# Patient Record
Sex: MALE | Race: WHITE | NOT HISPANIC OR LATINO | Employment: OTHER | URBAN - METROPOLITAN AREA
[De-identification: names, ages, dates, MRNs, and addresses within clinical notes are randomized per-mention and may not be internally consistent; named-entity substitution may affect disease eponyms.]

---

## 2024-04-24 ENCOUNTER — OFFICE VISIT (OUTPATIENT)
Dept: PODIATRY | Facility: CLINIC | Age: 79
End: 2024-04-24
Payer: MEDICARE

## 2024-04-24 VITALS — HEIGHT: 74 IN | WEIGHT: 244 LBS | BODY MASS INDEX: 31.32 KG/M2

## 2024-04-24 DIAGNOSIS — M77.52 CAPSULITIS OF TOE, LEFT: Primary | ICD-10-CM

## 2024-04-24 DIAGNOSIS — M77.51 CAPSULITIS OF TOE, RIGHT: ICD-10-CM

## 2024-04-24 PROCEDURE — 1160F RVW MEDS BY RX/DR IN RCRD: CPT | Mod: CPTII,S$GLB,, | Performed by: PODIATRIST

## 2024-04-24 PROCEDURE — 1125F AMNT PAIN NOTED PAIN PRSNT: CPT | Mod: CPTII,S$GLB,, | Performed by: PODIATRIST

## 2024-04-24 PROCEDURE — 1159F MED LIST DOCD IN RCRD: CPT | Mod: CPTII,S$GLB,, | Performed by: PODIATRIST

## 2024-04-24 PROCEDURE — 20600 DRAIN/INJ JOINT/BURSA W/O US: CPT | Mod: 50,S$GLB,, | Performed by: PODIATRIST

## 2024-04-24 PROCEDURE — 99203 OFFICE O/P NEW LOW 30 MIN: CPT | Mod: 25,S$GLB,, | Performed by: PODIATRIST

## 2024-04-24 RX ORDER — BENAZEPRIL HYDROCHLORIDE 40 MG/1
40 TABLET ORAL
COMMUNITY

## 2024-04-24 RX ORDER — BIMATOPROST 0.1 MG/ML
SOLUTION/ DROPS OPHTHALMIC
COMMUNITY
Start: 2024-03-29

## 2024-04-24 RX ORDER — SPIRONOLACTONE 25 MG/1
25 TABLET ORAL
COMMUNITY

## 2024-04-24 RX ORDER — OMEPRAZOLE 40 MG/1
40 CAPSULE, DELAYED RELEASE ORAL 2 TIMES DAILY
COMMUNITY
Start: 2024-03-11

## 2024-04-24 RX ORDER — DILTIAZEM HYDROCHLORIDE 240 MG/1
240 CAPSULE, EXTENDED RELEASE ORAL
COMMUNITY

## 2024-04-24 RX ORDER — COLCHICINE 0.6 MG/1
TABLET ORAL
COMMUNITY
Start: 2023-11-18

## 2024-04-24 RX ORDER — DULAGLUTIDE 1.5 MG/.5ML
1.5 INJECTION, SOLUTION SUBCUTANEOUS WEEKLY
COMMUNITY
Start: 2024-02-03

## 2024-04-24 RX ORDER — CARBIDOPA AND LEVODOPA 25; 100 MG/1; MG/1
1 TABLET ORAL 3 TIMES DAILY
COMMUNITY
Start: 2024-01-23

## 2024-04-24 RX ORDER — TIRZEPATIDE 2.5 MG/.5ML
2.5 INJECTION, SOLUTION SUBCUTANEOUS
COMMUNITY
Start: 2024-04-10 | End: 2024-05-30

## 2024-04-24 RX ORDER — KETOROLAC TROMETHAMINE 5 MG/ML
SOLUTION OPHTHALMIC
COMMUNITY
Start: 2024-04-02

## 2024-04-24 RX ORDER — DULAGLUTIDE 0.75 MG/.5ML
0.75 INJECTION, SOLUTION SUBCUTANEOUS WEEKLY
COMMUNITY
Start: 2023-11-02

## 2024-04-24 RX ORDER — NAPROXEN 500 MG/1
500 TABLET ORAL 2 TIMES DAILY WITH MEALS
Qty: 60 TABLET | Refills: 0 | Status: SHIPPED | OUTPATIENT
Start: 2024-04-24 | End: 2024-05-23

## 2024-04-24 RX ORDER — FLUTICASONE PROPIONATE 50 MCG
SPRAY, SUSPENSION (ML) NASAL
COMMUNITY

## 2024-04-24 RX ORDER — BRIMONIDINE TARTRATE 2 MG/ML
SOLUTION/ DROPS OPHTHALMIC
COMMUNITY
Start: 2024-01-19

## 2024-04-24 RX ORDER — ROSUVASTATIN CALCIUM 20 MG/1
20 TABLET, COATED ORAL NIGHTLY
COMMUNITY

## 2024-04-24 RX ORDER — METOPROLOL SUCCINATE 50 MG/1
50 TABLET, EXTENDED RELEASE ORAL
COMMUNITY

## 2024-04-24 RX ORDER — FLUTICASONE FUROATE, UMECLIDINIUM BROMIDE AND VILANTEROL TRIFENATATE 100; 62.5; 25 UG/1; UG/1; UG/1
1 POWDER RESPIRATORY (INHALATION)
COMMUNITY

## 2024-04-24 RX ORDER — CITALOPRAM 20 MG/1
20 TABLET, FILM COATED ORAL
COMMUNITY

## 2024-04-24 RX ORDER — DORZOLAMIDE HYDROCHLORIDE AND TIMOLOL MALEATE 20; 5 MG/ML; MG/ML
SOLUTION/ DROPS OPHTHALMIC
COMMUNITY
Start: 2024-03-29

## 2024-04-24 RX ORDER — TEMAZEPAM 15 MG/1
15 CAPSULE ORAL
COMMUNITY
Start: 2024-02-29 | End: 2024-05-29

## 2024-04-24 RX ORDER — APIXABAN 5 MG/1
5 TABLET, FILM COATED ORAL 2 TIMES DAILY
COMMUNITY

## 2024-04-24 RX ORDER — DOXYCYCLINE HYCLATE 100 MG
100 TABLET ORAL 2 TIMES DAILY
COMMUNITY
Start: 2024-03-12

## 2024-04-24 RX ADMIN — DEXAMETHASONE SODIUM PHOSPHATE 4 MG: 4 INJECTION, SOLUTION INTRA-ARTICULAR; INTRALESIONAL; INTRAMUSCULAR; INTRAVENOUS; SOFT TISSUE at 02:04

## 2024-04-24 RX ADMIN — LIDOCAINE HYDROCHLORIDE 1 ML: 10 INJECTION INFILTRATION; PERINEURAL at 02:04

## 2024-05-14 ENCOUNTER — OFFICE VISIT (OUTPATIENT)
Dept: PODIATRY | Facility: CLINIC | Age: 79
End: 2024-05-14
Payer: MEDICARE

## 2024-05-14 VITALS — WEIGHT: 242.81 LBS | BODY MASS INDEX: 31.16 KG/M2 | HEIGHT: 74 IN

## 2024-05-14 DIAGNOSIS — M77.51 CAPSULITIS OF TOE, RIGHT: Primary | ICD-10-CM

## 2024-05-14 PROCEDURE — 1159F MED LIST DOCD IN RCRD: CPT | Mod: CPTII,S$GLB,, | Performed by: PODIATRIST

## 2024-05-14 PROCEDURE — 1160F RVW MEDS BY RX/DR IN RCRD: CPT | Mod: CPTII,S$GLB,, | Performed by: PODIATRIST

## 2024-05-14 PROCEDURE — 20600 DRAIN/INJ JOINT/BURSA W/O US: CPT | Mod: RT,S$GLB,, | Performed by: PODIATRIST

## 2024-05-14 PROCEDURE — 1125F AMNT PAIN NOTED PAIN PRSNT: CPT | Mod: CPTII,S$GLB,, | Performed by: PODIATRIST

## 2024-05-14 PROCEDURE — 99213 OFFICE O/P EST LOW 20 MIN: CPT | Mod: 25,S$GLB,, | Performed by: PODIATRIST

## 2024-05-14 RX ADMIN — DEXAMETHASONE SODIUM PHOSPHATE 4 MG: 4 INJECTION, SOLUTION INTRA-ARTICULAR; INTRALESIONAL; INTRAMUSCULAR; INTRAVENOUS; SOFT TISSUE at 11:05

## 2024-05-14 RX ADMIN — LIDOCAINE HYDROCHLORIDE 1 ML: 10 INJECTION INFILTRATION; PERINEURAL at 11:05

## 2024-05-15 RX ORDER — DEXAMETHASONE SODIUM PHOSPHATE 4 MG/ML
4 INJECTION, SOLUTION INTRA-ARTICULAR; INTRALESIONAL; INTRAMUSCULAR; INTRAVENOUS; SOFT TISSUE
Status: DISCONTINUED | OUTPATIENT
Start: 2024-04-24 | End: 2024-05-15 | Stop reason: HOSPADM

## 2024-05-15 RX ORDER — LIDOCAINE HYDROCHLORIDE 10 MG/ML
1 INJECTION INFILTRATION; PERINEURAL
Status: DISCONTINUED | OUTPATIENT
Start: 2024-04-24 | End: 2024-05-15 | Stop reason: HOSPADM

## 2024-05-15 NOTE — PROGRESS NOTES
Subjective:     Patient ID: Endy Rico is a 78 y.o. male.    Chief Complaint: Foot Pain and Foot Swelling    Endy is a 78 y.o. male who presents to the podiatry clinic  with complaint of  bilateral foot pain. Onset of the symptoms was several days ago. Precipitating event: none known. Current symptoms include: ability to bear weight, but with some pain, redness, swelling, and worsening symptoms after a period of activity. Aggravating factors: walking. Symptoms have gradually worsened. Patient has had prior foot problems.  Treatment to date:  colchicine . Patients rates pain 8/10 on pain scale.    Review of Systems   Constitutional: Negative for chills and fever.   Cardiovascular:  Negative for chest pain and leg swelling.   Respiratory:  Negative for cough and shortness of breath.    Gastrointestinal:  Negative for diarrhea, nausea and vomiting.        Objective:     Physical Exam  Vitals reviewed.   Constitutional:       General: He is not in acute distress.     Appearance: Normal appearance. He is not ill-appearing.   HENT:      Head: Normocephalic.      Nose: Nose normal.   Cardiovascular:      Rate and Rhythm: Normal rate.   Pulmonary:      Effort: Pulmonary effort is normal. No respiratory distress.   Skin:     Capillary Refill: Capillary refill takes 2 to 3 seconds.   Neurological:      Mental Status: He is alert and oriented to person, place, and time.   Psychiatric:         Mood and Affect: Mood normal.         Behavior: Behavior normal.         Thought Content: Thought content normal.     Neurologic: Protective and light touch sensation intact bilateral lower extremity, positive paresthesias reported   Dermatologic: No open lesions noted bilateral foot, no rashes noted bilateral foot, mild swelling and redness noted at the bilateral 1st MPJ   Musculoskeletal:  5/5 muscle strength noted bilateral foot, ankle joint range of motion is reduced without pain, pain and tenderness with palpation and range of  motion of the bilateral 1st MPJ, bilateral 1st MPJ range of motion is mildly reduced, no masses noted bilateral foot   Vascular: DP and PT pulses palpable 1/4 bilateral foot, +1 nonpitting edema noted to bilateral ankle, capillary fill time less 3 seconds to distal aspect of the digits bilateral foot      Assessment:      Encounter Diagnoses   Name Primary?    Capsulitis of toe, left Yes    Capsulitis of toe, right      Plan:     Endy was seen today for foot pain and foot swelling.    Diagnoses and all orders for this visit:    Capsulitis of toe, left    Capsulitis of toe, right    Other orders  -     naproxen (NAPROSYN) 500 MG tablet; Take 1 tablet (500 mg total) by mouth 2 (two) times daily with meals.      I counseled the patient on his conditions, their implications and medical management.        1. Patient was examined and evaluated.    2. Discussed with patient etiology of capsulitis versus gout complaints bilateral 1st MPJ.  Discussed with patient potential benefits of a local corticosteroid injection for improvement of pain complaints in the area.  Patient was advised to ice and elevate the bilateral lower extremity at end of days and decrease the amount of ambulation.  Discussed with patient etiology of gout.  Patient was also advised of potential benefits of prescription NSAIDs for relief of pain.    Small Joint Aspiration/Injection: R great MTP, L great MTP    Date/Time: 4/24/2024 2:00 PM    Performed by: Yunier Martin DPM  Authorized by: Yunier Martin DPM    Consent Done?:  Yes (Verbal)  Indications:  Arthritis and pain  Location:  Great toe  Site:  R great MTP and L great MTP  Ultrasonic guidance for needle placement?: No    Needle size:  25 G  Approach:  Medial  Medications:  1 mL LIDOcaine HCL 10 mg/ml (1%) 10 mg/mL (1 %); 4 mg dexAMETHasone 4 mg/mL  Patient tolerance:  Patient tolerated the procedure well with no immediate complications      3. Patient will continue with comfortable shoe  gear both inside and outside the home   4. Patient will follow-up in 1-2 weeks or p.r.n. for complaints

## 2024-05-21 ENCOUNTER — OFFICE VISIT (OUTPATIENT)
Dept: PODIATRY | Facility: CLINIC | Age: 79
End: 2024-05-21
Payer: MEDICARE

## 2024-05-21 VITALS — WEIGHT: 244 LBS | HEIGHT: 74 IN | BODY MASS INDEX: 31.32 KG/M2

## 2024-05-21 DIAGNOSIS — M25.572 SINUS TARSI SYNDROME OF LEFT ANKLE: Primary | ICD-10-CM

## 2024-05-21 DIAGNOSIS — M10.372 ACUTE GOUT DUE TO RENAL IMPAIRMENT INVOLVING TOE OF LEFT FOOT: ICD-10-CM

## 2024-05-21 PROCEDURE — 1160F RVW MEDS BY RX/DR IN RCRD: CPT | Mod: CPTII,S$GLB,, | Performed by: PODIATRIST

## 2024-05-21 PROCEDURE — 20605 DRAIN/INJ JOINT/BURSA W/O US: CPT | Mod: LT,S$GLB,, | Performed by: PODIATRIST

## 2024-05-21 PROCEDURE — 1125F AMNT PAIN NOTED PAIN PRSNT: CPT | Mod: CPTII,S$GLB,, | Performed by: PODIATRIST

## 2024-05-21 PROCEDURE — 1159F MED LIST DOCD IN RCRD: CPT | Mod: CPTII,S$GLB,, | Performed by: PODIATRIST

## 2024-05-21 PROCEDURE — 99214 OFFICE O/P EST MOD 30 MIN: CPT | Mod: 25,S$GLB,, | Performed by: PODIATRIST

## 2024-05-21 RX ORDER — METHYLPREDNISOLONE 4 MG/1
TABLET ORAL
Qty: 21 EACH | Refills: 0 | Status: SHIPPED | OUTPATIENT
Start: 2024-05-21 | End: 2024-06-11

## 2024-05-21 RX ADMIN — DEXAMETHASONE SODIUM PHOSPHATE 4 MG: 4 INJECTION, SOLUTION INTRA-ARTICULAR; INTRALESIONAL; INTRAMUSCULAR; INTRAVENOUS; SOFT TISSUE at 02:05

## 2024-05-21 RX ADMIN — LIDOCAINE HYDROCHLORIDE 1 ML: 10 INJECTION INFILTRATION; PERINEURAL at 02:05

## 2024-05-21 NOTE — PROGRESS NOTES
Subjective:     Patient ID: Endy Rico is a 78 y.o. male.    Chief Complaint: Foot Pain    Endy is a 78 y.o. male who presents to the podiatry clinic  with complaint of  left foot pain. Onset of the symptoms was several days ago. Precipitating event: none known. Current symptoms include: ability to bear weight, but with some pain, redness, swelling, and worsening symptoms after a period of activity. Aggravating factors: any weight bearing. Symptoms have gradually improved. Patient has had prior foot problems. . Treatment to date: none. Patients rates pain 9/10 on pain scale.    Review of Systems   Constitutional: Negative for chills and fever.   Cardiovascular:  Positive for leg swelling. Negative for chest pain.   Respiratory:  Negative for cough and shortness of breath.    Gastrointestinal:  Negative for diarrhea, nausea and vomiting.        Objective:     Physical Exam  Vitals reviewed.   Constitutional:       General: He is not in acute distress.     Appearance: Normal appearance. He is not ill-appearing.   HENT:      Head: Normocephalic.      Nose: Nose normal.   Pulmonary:      Effort: Pulmonary effort is normal. No respiratory distress.   Skin:     Capillary Refill: Capillary refill takes 2 to 3 seconds.   Neurological:      Mental Status: He is alert and oriented to person, place, and time.   Psychiatric:         Mood and Affect: Mood normal.         Behavior: Behavior normal.         Thought Content: Thought content normal.       Neurologic: Protective and light touch sensation intact bilateral lower extremity, positive paresthesias reported bilateral foot   Musculoskeletal: 5/5 muscle strength noted bilateral foot, left ankle joint range of motion is reduced and painful, no masses noted bilateral foot, no pain and tenderness with palpation bilateral 1st MPJs, pain and tenderness with palpation of the left sinus tarsi region and range of motion of the left ankle and inversion/ eversion   Dermatologic: No  open lesions noted bilateral foot, no rashes noted bilateral foot, no interdigital maceration noted bilateral foot, left ankle is swollen with warmth and redness noted   Vascular:  DP PT pulses palpable 1/4 bilateral foot, capillary fill time less than 3 seconds to distal aspect of the digits bilateral foot, +1 nonpitting edema noted to bilateral ankle left foot greater than right    Assessment:      Encounter Diagnoses   Name Primary?    Sinus tarsi syndrome of left ankle Yes    Acute gout due to renal impairment involving toe of left foot      Plan:     Endy was seen today for foot pain.    Diagnoses and all orders for this visit:    Sinus tarsi syndrome of left ankle  -     Intermediate Joint Aspiration/Injection: L ankle    Acute gout due to renal impairment involving toe of left foot  -     methylPREDNISolone (MEDROL DOSEPACK) 4 mg tablet; use as directed      I counseled the patient on his conditions, their implications and medical management.        1. Patient was examined and evaluated.    2. Discussed with patient etiology of sinus tarsi syndrome to the left ankle.  Patient made aware of potential benefits of local corticosteroid injection oral Medrol Dosepak for improvement of pain complaints.  Patient will decrease the amount of ambulation and ice and elevate the left lower extremity end of days.    Intermediate Joint Aspiration/Injection: L ankle    Date/Time: 5/21/2024 2:15 PM    Performed by: Yunier Martin DPM  Authorized by: Yunier Martin DPM    Consent Done?:  Yes (Verbal)  Indications:  Pain, arthritis and joint swelling    Location:  Ankle  Site:  L ankle  Ultrasonic Guidance for needle placement: No  Needle size:  25 G  Approach:  Anterolateral  Medications:  1 mL LIDOcaine HCL 10 mg/ml (1%) 10 mg/mL (1 %); 4 mg dexAMETHasone 4 mg/mL  Patient tolerance:  Patient tolerated the procedure well with no immediate complications        3. Patient was made aware that we can not rule out acute  gout attack of the left ankle at this point.  Discussed with patient need for obtainment of uric acid levels at the next primary care physician appointment.  Patient made aware that previously discussed local corticosteroid injection or oral Medrol Dosepak should improve complaints from both gout and sinus tarsiitis  4. Patient will follow-up in 1 week or p.r.n. for complaints

## 2024-05-23 RX ORDER — NAPROXEN 500 MG/1
500 TABLET ORAL 2 TIMES DAILY WITH MEALS
Qty: 60 TABLET | Refills: 0 | Status: SHIPPED | OUTPATIENT
Start: 2024-05-23

## 2024-05-30 ENCOUNTER — OFFICE VISIT (OUTPATIENT)
Dept: PODIATRY | Facility: CLINIC | Age: 79
End: 2024-05-30
Payer: MEDICARE

## 2024-05-30 VITALS — HEIGHT: 74 IN | WEIGHT: 244 LBS | BODY MASS INDEX: 31.32 KG/M2

## 2024-05-30 DIAGNOSIS — M77.42 METATARSALGIA OF BOTH FEET: Primary | ICD-10-CM

## 2024-05-30 DIAGNOSIS — M77.41 METATARSALGIA OF BOTH FEET: Primary | ICD-10-CM

## 2024-05-30 PROCEDURE — 1159F MED LIST DOCD IN RCRD: CPT | Mod: CPTII,S$GLB,, | Performed by: PODIATRIST

## 2024-05-30 PROCEDURE — 1160F RVW MEDS BY RX/DR IN RCRD: CPT | Mod: CPTII,S$GLB,, | Performed by: PODIATRIST

## 2024-05-30 PROCEDURE — 1126F AMNT PAIN NOTED NONE PRSNT: CPT | Mod: CPTII,S$GLB,, | Performed by: PODIATRIST

## 2024-05-30 PROCEDURE — 99213 OFFICE O/P EST LOW 20 MIN: CPT | Mod: S$GLB,,, | Performed by: PODIATRIST

## 2024-05-30 RX ORDER — TIRZEPATIDE 5 MG/.5ML
INJECTION, SOLUTION SUBCUTANEOUS
COMMUNITY
Start: 2024-05-28

## 2024-06-02 RX ORDER — LIDOCAINE HYDROCHLORIDE 10 MG/ML
1 INJECTION INFILTRATION; PERINEURAL
Status: DISCONTINUED | OUTPATIENT
Start: 2024-05-14 | End: 2024-06-02 | Stop reason: HOSPADM

## 2024-06-02 RX ORDER — DEXAMETHASONE SODIUM PHOSPHATE 4 MG/ML
4 INJECTION, SOLUTION INTRA-ARTICULAR; INTRALESIONAL; INTRAMUSCULAR; INTRAVENOUS; SOFT TISSUE
Status: DISCONTINUED | OUTPATIENT
Start: 2024-05-14 | End: 2024-06-02 | Stop reason: HOSPADM

## 2024-06-03 ENCOUNTER — OFFICE VISIT (OUTPATIENT)
Dept: PODIATRY | Facility: CLINIC | Age: 79
End: 2024-06-03
Payer: MEDICARE

## 2024-06-03 VITALS — WEIGHT: 244 LBS | HEIGHT: 74 IN | BODY MASS INDEX: 31.32 KG/M2

## 2024-06-03 DIAGNOSIS — B35.3 TINEA PEDIS OF BOTH FEET: Primary | ICD-10-CM

## 2024-06-03 PROCEDURE — 99214 OFFICE O/P EST MOD 30 MIN: CPT | Mod: S$GLB,,, | Performed by: PODIATRIST

## 2024-06-03 PROCEDURE — 1159F MED LIST DOCD IN RCRD: CPT | Mod: CPTII,S$GLB,, | Performed by: PODIATRIST

## 2024-06-03 PROCEDURE — 1160F RVW MEDS BY RX/DR IN RCRD: CPT | Mod: CPTII,S$GLB,, | Performed by: PODIATRIST

## 2024-06-03 RX ORDER — CLOTRIMAZOLE AND BETAMETHASONE DIPROPIONATE 10; .64 MG/G; MG/G
CREAM TOPICAL 2 TIMES DAILY
Qty: 45 G | Refills: 2 | Status: SHIPPED | OUTPATIENT
Start: 2024-06-03

## 2024-06-03 RX ORDER — METHYLPREDNISOLONE 4 MG/1
TABLET ORAL
Qty: 21 EACH | Refills: 0 | Status: SHIPPED | OUTPATIENT
Start: 2024-06-03 | End: 2024-06-24

## 2024-06-03 NOTE — PROGRESS NOTES
Subjective:     Patient ID: Endy Rico is a 78 y.o. male.    Chief Complaint: Foot Pain    Endy is a 78 y.o. male who presents to the podiatry clinic  with complaint of  left foot pain. Onset of the symptoms was several days ago. Precipitating event: increased activity and recent round of golf . Current symptoms include: ability to bear weight, but with some pain, stiffness, swelling, and worsening symptoms after a period of activity. Aggravating factors: walking. Symptoms have waxed and waned but are better overall. Patient has had prior foot problems. Treatment to date: corticosteroid injection which was somewhat effective and oral Medrol dosepak which was especially effective . Patients rates pain 7/10 on pain scale.  Patient also complains of red peeling itchy skin left foot greater than right.    Review of Systems   Constitutional: Negative for chills and fever.   Cardiovascular:  Positive for leg swelling. Negative for chest pain.   Respiratory:  Negative for cough and shortness of breath.    Gastrointestinal:  Negative for diarrhea, nausea and vomiting.        Objective:     Physical Exam  Vitals reviewed.   Constitutional:       General: He is not in acute distress.     Appearance: Normal appearance. He is not ill-appearing.   HENT:      Head: Normocephalic.      Nose: Nose normal.   Pulmonary:      Effort: Pulmonary effort is normal. No respiratory distress.   Skin:     Capillary Refill: Capillary refill takes 2 to 3 seconds.   Neurological:      Mental Status: He is alert and oriented to person, place, and time.   Psychiatric:         Mood and Affect: Mood normal.         Behavior: Behavior normal.         Thought Content: Thought content normal.       Dermatologic: Dry, flaky, erythematous skin noted in a moccasin distribution bilateral lower extremity, mild redness noted to the dorsal aspect of the left foot, no open lesions noted bilateral foot, no rashes noted bilateral foot   Neurologic: Protective  and light touch sensation intact bilateral lower extremity, positive paresthesias reported   Vascular: DP and PT pulses palpable 1/4 bilateral foot, + 2 nonpitting edema noted to the left foot, pedal hair growth is absent bilateral lower extremity   Musculoskeletal:  Bilateral 5/5 muscle strength noted bilateral foot, ankle joint range of motion is reduced to the left with pain and tenderness in the anterior lateral ankle gutter and the sinus tarsi region    Assessment:      Encounter Diagnosis   Name Primary?    Tinea pedis of both feet Yes     Plan:     Endy was seen today for foot pain.    Diagnoses and all orders for this visit:    Tinea pedis of both feet  -     clotrimazole-betamethasone 1-0.05% (LOTRISONE) cream; Apply topically 2 (two) times daily.    Other orders  -     methylPREDNISolone (MEDROL DOSEPACK) 4 mg tablet; use as directed      I counseled the patient on his conditions, their implications and medical management.        1. Patient was examined and evaluated.    2. Discussed with patient etiology of sinus tarsi syndrome.  Discussed with patient potential benefits of local corticosteroid injection or oral Medrol Dosepak for improvement of pain complaints.  Patient was advised ice and elevate the lower extremity when at rest and decrease the amount of ambulation.  3. Discussed with patient etiology of tinea pedis.  Discussed with patient proper use of prescription Lotrisone cream.  Patient was warned of potential recurrence over time.   4. Patient was advised to reduced level of daily activity.  Patient was cautioned against repeat golf activity  5. Patient will follow up in 1 week or p.r.n. for complaints

## 2024-06-03 NOTE — PROGRESS NOTES
Subjective:     Patient ID: Endy Rico is a 78 y.o. male.    Chief Complaint: Foot Pain    Endy is a 78 y.o. male who presents to the podiatry clinic  with complaint of  right foot pain. Onset of the symptoms was several weeks ago. Precipitating event: none known. Current symptoms include: ability to bear weight, but with some pain and worsening symptoms after a period of activity. Aggravating factors: walking. Symptoms have gradually improved. Patient has had prior foot problems. Treatment to date: corticosteroid injection which was effective. Patients rates pain 2/10 on pain scale.    Review of Systems   Constitutional: Negative for chills and fever.   Cardiovascular:  Negative for chest pain and leg swelling.   Respiratory:  Negative for cough and shortness of breath.    Gastrointestinal:  Negative for diarrhea, nausea and vomiting.        Objective:     Physical Exam  Vitals reviewed.   Constitutional:       General: He is not in acute distress.     Appearance: Normal appearance. He is not ill-appearing.   HENT:      Head: Normocephalic.      Nose: Nose normal.   Cardiovascular:      Rate and Rhythm: Normal rate.   Pulmonary:      Effort: Pulmonary effort is normal. No respiratory distress.   Skin:     Capillary Refill: Capillary refill takes 2 to 3 seconds.   Neurological:      Mental Status: He is alert and oriented to person, place, and time.   Psychiatric:         Mood and Affect: Mood normal.         Behavior: Behavior normal.         Thought Content: Thought content normal.     Neurologic: Protective and light touch sensation intact bilateral lower extremity, positive paresthesias reported   Dermatologic: No open lesions noted bilateral foot, no rashes noted bilateral foot, mild swelling and redness noted at the bilateral 1st MPJ   Musculoskeletal:  5/5 muscle strength noted bilateral foot, ankle joint range of motion is reduced without pain, pain and tenderness with palpation and range of motion of the  right 1st MPJ, right 1st MPJ range of motion is mildly reduced, no masses noted bilateral foot   Vascular: DP and PT pulses palpable 1/4 bilateral foot, +1 nonpitting edema noted to bilateral ankle, capillary fill time less 3 seconds to distal aspect of the digits bilateral foot    Assessment:      Encounter Diagnosis   Name Primary?    Capsulitis of toe, right Yes     Plan:     Endy was seen today for foot pain.    Diagnoses and all orders for this visit:    Capsulitis of toe, right  -     Small Joint Aspiration/Injection: R great MTP      I counseled the patient on his conditions, their implications and medical management.        1. Patient was examined and evaluated.    2. Patient made aware noted improvement of previous capsulitis symptoms left foot.  Patient will mild tenderness still present in right foot.  Discussed with patient potential benefits of continued local corticosteroid injections in the area.  3. Patient will continue with comfortable shoe gear both inside and outside the home   Small Joint Aspiration/Injection: R great MTP    Date/Time: 5/14/2024 11:00 AM    Performed by: Yunier Martin DPM  Authorized by: Yunier Martin DPM    Consent Done?:  Yes (Verbal)  Indications:  Pain and arthritis  Location:  Great toe  Site:  R great MTP  Ultrasonic guidance for needle placement?: No    Needle size:  25 G  Approach:  Medial  Medications:  1 mL LIDOcaine HCL 10 mg/ml (1%) 10 mg/mL (1 %); 4 mg dexAMETHasone 4 mg/mL  Patient tolerance:  Patient tolerated the procedure well with no immediate complications      4. Patient will follow-up in 1 week or p.r.n. for complaints

## 2024-06-10 ENCOUNTER — OFFICE VISIT (OUTPATIENT)
Dept: PODIATRY | Facility: CLINIC | Age: 79
End: 2024-06-10
Payer: MEDICARE

## 2024-06-10 VITALS — WEIGHT: 230.38 LBS | BODY MASS INDEX: 29.57 KG/M2 | HEIGHT: 74 IN

## 2024-06-10 DIAGNOSIS — E11.42 DIABETIC POLYNEUROPATHY ASSOCIATED WITH TYPE 2 DIABETES MELLITUS: ICD-10-CM

## 2024-06-10 DIAGNOSIS — M77.8 EXTENSOR TENDONITIS OF FOOT: ICD-10-CM

## 2024-06-10 DIAGNOSIS — E11.49 TYPE II DIABETES MELLITUS WITH NEUROLOGICAL MANIFESTATIONS: Primary | ICD-10-CM

## 2024-06-10 PROCEDURE — 1101F PT FALLS ASSESS-DOCD LE1/YR: CPT | Mod: CPTII,S$GLB,, | Performed by: PODIATRIST

## 2024-06-10 PROCEDURE — 99213 OFFICE O/P EST LOW 20 MIN: CPT | Mod: S$GLB,,, | Performed by: PODIATRIST

## 2024-06-10 PROCEDURE — 3288F FALL RISK ASSESSMENT DOCD: CPT | Mod: CPTII,S$GLB,, | Performed by: PODIATRIST

## 2024-06-10 PROCEDURE — 1126F AMNT PAIN NOTED NONE PRSNT: CPT | Mod: CPTII,S$GLB,, | Performed by: PODIATRIST

## 2024-06-10 PROCEDURE — 1160F RVW MEDS BY RX/DR IN RCRD: CPT | Mod: CPTII,S$GLB,, | Performed by: PODIATRIST

## 2024-06-10 PROCEDURE — 1159F MED LIST DOCD IN RCRD: CPT | Mod: CPTII,S$GLB,, | Performed by: PODIATRIST

## 2024-06-10 NOTE — PROGRESS NOTES
Subjective:     Patient ID: Endy Rico is a 78 y.o. male.    Chief Complaint: Follow-up (Pt is here to follow up on foot pain.)    Endy is a 78 y.o. male who presents to the podiatry clinic  with complaint of  bilateral foot pain. Onset of the symptoms was several weeks ago. Precipitating event: increased activity. Current symptoms include: stiffness and worsening symptoms after a period of activity. Aggravating factors:  prolonged activity . Symptoms have essentially resolved. Patient has had prior foot problems. Treatment to date:  medrol dosepak which was effective . Patients rates pain 0/10 on pain scale.    Review of Systems   Constitutional: Negative for chills and fever.   Cardiovascular:  Negative for chest pain and leg swelling.   Respiratory:  Negative for cough and shortness of breath.    Gastrointestinal:  Negative for diarrhea, nausea and vomiting.   Neurological:  Positive for numbness and paresthesias.        Objective:     Physical Exam  Vitals reviewed.   Constitutional:       General: He is not in acute distress.     Appearance: Normal appearance. He is not ill-appearing.   HENT:      Head: Normocephalic.      Nose: Nose normal.   Cardiovascular:      Rate and Rhythm: Normal rate.   Pulmonary:      Effort: Pulmonary effort is normal. No respiratory distress.   Skin:     Capillary Refill: Capillary refill takes 2 to 3 seconds.   Neurological:      Mental Status: He is alert and oriented to person, place, and time.   Psychiatric:         Mood and Affect: Mood normal.         Behavior: Behavior normal.         Thought Content: Thought content normal.         Dermatologic: Dry, flaky, erythematous skin noted in a moccasin distribution bilateral lower extremity, mild redness noted to the dorsal aspect of the left foot, no open lesions noted bilateral foot, no rashes noted bilateral foot   Neurologic: Protective and light touch sensation intact bilateral lower extremity, positive paresthesias reported    Vascular: DP and PT pulses palpable 1/4 bilateral foot, + 2 nonpitting edema noted to the left foot, pedal hair growth is absent bilateral lower extremity   Musculoskeletal:  Bilateral 5/5 muscle strength noted bilateral foot, ankle joint range of motion is reduced to the left without pain and tenderness, stiffness and tightness mild to minimal pain symptoms with dorsiflexion plantar flexion of the foot.      Assessment:      Encounter Diagnoses   Name Primary?    Type II diabetes mellitus with neurological manifestations Yes    Diabetic polyneuropathy associated with type 2 diabetes mellitus     Extensor tendonitis of foot      Plan:     Endy was seen today for follow-up.    Diagnoses and all orders for this visit:    Type II diabetes mellitus with neurological manifestations    Diabetic polyneuropathy associated with type 2 diabetes mellitus    Extensor tendonitis of foot      I counseled the patient on his conditions, their implications and medical management.        1. Patient was examined and evaluated.    2. Discussed with patient mild stiffness and feet related to extensor tendinitis.  Patient will continue active passive range of motion of the bilateral lower extremity when at rest.  Patient will ice and elevate the lower extremity if necessary.  Patient was encouraged to continue to adjunct with OTC analgesics/NSAIDs for pain relief.  Discussed with patient potential repeat corticosteroid injection shoe pain complaints return.    3. Patient was advised to continue with daily foot monitoring.  Patient will continue efforts proper glycemic control, lowering hemoglobin A1c, adherence to diabetic medication regimen  4. Patient will follow-up p.r.n. for complaints

## 2024-06-12 ENCOUNTER — OFFICE VISIT (OUTPATIENT)
Dept: PODIATRY | Facility: CLINIC | Age: 79
End: 2024-06-12
Payer: MEDICARE

## 2024-06-12 VITALS — WEIGHT: 230 LBS | BODY MASS INDEX: 29.52 KG/M2 | HEIGHT: 74 IN

## 2024-06-12 DIAGNOSIS — M77.42 METATARSALGIA OF BOTH FEET: Primary | ICD-10-CM

## 2024-06-12 DIAGNOSIS — E11.49 TYPE II DIABETES MELLITUS WITH NEUROLOGICAL MANIFESTATIONS: ICD-10-CM

## 2024-06-12 DIAGNOSIS — D36.10 NEUROMA: ICD-10-CM

## 2024-06-12 DIAGNOSIS — M77.41 METATARSALGIA OF BOTH FEET: Primary | ICD-10-CM

## 2024-06-12 PROCEDURE — 99214 OFFICE O/P EST MOD 30 MIN: CPT | Mod: 25,S$GLB,, | Performed by: PODIATRIST

## 2024-06-12 PROCEDURE — 1125F AMNT PAIN NOTED PAIN PRSNT: CPT | Mod: CPTII,S$GLB,, | Performed by: PODIATRIST

## 2024-06-12 PROCEDURE — 1159F MED LIST DOCD IN RCRD: CPT | Mod: CPTII,S$GLB,, | Performed by: PODIATRIST

## 2024-06-12 PROCEDURE — 64455 NJX AA&/STRD PLTR COM DG NRV: CPT | Mod: 50,S$GLB,, | Performed by: PODIATRIST

## 2024-06-12 RX ORDER — METHYLPREDNISOLONE 4 MG/1
TABLET ORAL
Qty: 21 EACH | Refills: 0 | Status: SHIPPED | OUTPATIENT
Start: 2024-06-12 | End: 2024-06-12 | Stop reason: CLARIF

## 2024-06-12 RX ORDER — LIDOCAINE HYDROCHLORIDE 10 MG/ML
1 INJECTION INFILTRATION; PERINEURAL
Status: DISCONTINUED | OUTPATIENT
Start: 2024-06-12 | End: 2024-06-12 | Stop reason: HOSPADM

## 2024-06-12 RX ORDER — DEXAMETHASONE SODIUM PHOSPHATE 4 MG/ML
4 INJECTION, SOLUTION INTRA-ARTICULAR; INTRALESIONAL; INTRAMUSCULAR; INTRAVENOUS; SOFT TISSUE
Status: DISCONTINUED | OUTPATIENT
Start: 2024-06-12 | End: 2024-06-12 | Stop reason: HOSPADM

## 2024-06-12 RX ORDER — METHYLPREDNISOLONE 4 MG/1
TABLET ORAL
Qty: 21 EACH | Refills: 0 | Status: SHIPPED | OUTPATIENT
Start: 2024-06-12 | End: 2024-07-03

## 2024-06-12 RX ADMIN — DEXAMETHASONE SODIUM PHOSPHATE 4 MG: 4 INJECTION, SOLUTION INTRA-ARTICULAR; INTRALESIONAL; INTRAMUSCULAR; INTRAVENOUS; SOFT TISSUE at 10:06

## 2024-06-12 RX ADMIN — LIDOCAINE HYDROCHLORIDE 1 ML: 10 INJECTION INFILTRATION; PERINEURAL at 10:06

## 2024-06-12 NOTE — PROGRESS NOTES
Subjective:     Patient ID: Endy Rico is a 78 y.o. male.    Chief Complaint: Foot Pain    Endy is a 78 y.o. male who presents to the podiatry clinic  with complaint of  bilateral foot pain. Onset of the symptoms was several days ago. Precipitating event: none known. Current symptoms include: ability to bear weight, but with some pain and worsening symptoms after a period of activity. Aggravating factors: any weight bearing. Symptoms have waxed and waned but are better overall. Patient has had prior foot problems. Treatment to date: corticosteroid injection which was effective, rest, and oral Medrol Dosepak . Patients rates pain 6/10 on pain scale.    Review of Systems   Constitutional: Negative for chills and fever.   Cardiovascular:  Positive for leg swelling. Negative for chest pain.   Respiratory:  Negative for cough and shortness of breath.    Gastrointestinal:  Negative for diarrhea, nausea and vomiting.   Neurological:  Positive for numbness and paresthesias.        Objective:     Physical Exam  Vitals reviewed.   Constitutional:       General: He is not in acute distress.     Appearance: Normal appearance. He is not ill-appearing.   HENT:      Head: Normocephalic.      Nose: Nose normal.   Pulmonary:      Effort: Pulmonary effort is normal. No respiratory distress.   Skin:     Capillary Refill: Capillary refill takes 2 to 3 seconds.   Neurological:      Mental Status: He is alert and oriented to person, place, and time.   Psychiatric:         Mood and Affect: Mood normal.         Behavior: Behavior normal.         Thought Content: Thought content normal.       Neurologic: Protective and light touch sensation intact bilateral lower extremity, positive paresthesias reported, positive Gustavo sign bilateral 2nd interspace   Musculoskeletal: 5/5 muscle strength noted bilateral foot, ankle joint range of motion is reduced without pain pain and tenderness with palpation and range of motion bilateral MTPJ region,  no masses noted bilateral foot   Dermatologic: No open lesions noted bilateral foot, no rashes noted bilateral foot   Vascular: DP pulses palpable 1/4 bilateral foot, PT pulse 1/4 bilateral foot +1 nonpitting edema bilateral ankle    Assessment:      Encounter Diagnoses   Name Primary?    Metatarsalgia of both feet Yes    Neuroma     Type II diabetes mellitus with neurological manifestations      Plan:     Endy was seen today for foot pain.    Diagnoses and all orders for this visit:    Metatarsalgia of both feet    Neuroma  -     Neuroma injection    Type II diabetes mellitus with neurological manifestations    Other orders  -     Discontinue: methylPREDNISolone (MEDROL DOSEPACK) 4 mg tablet; use as directed  -     methylPREDNISolone (MEDROL DOSEPACK) 4 mg tablet; use as directed      I counseled the patient on his conditions, their implications and medical management.        1. Patient was examined and evaluated.    2. Discussed patient etiology of metatarsalgia of both feet.  Patient was made aware that symptoms may not be directly related to previous treated gout changes in the inflammation.  Patient was advised of potential benefits of offloading pad metatarsal kamilah beneath the ball of each foot and potential benefits of local corticosteroid injection oral Medrol Dosepak.  For pain relief patient will ice and elevate bilateral lower extremity end of days.    Neuroma injection    Date/Time: 6/12/2024 10:00 AM    Performed by: Yunier Martin DPM  Authorized by: Yunier Martin DPM    Consent Done?:  Yes (Verbal)  Indications:  Pain  Location Left Foot:  [employer]  Location Right Foot:  [employer]  Needle size:  25 G  Approach:  Dorsal  Medications:  1 mL LIDOcaine HCL 10 mg/ml (1%) 10 mg/mL (1 %); 4 mg dexAMETHasone 4 mg/mL  Patient tolerance:  Patient tolerated the procedure well with no immediate complications      3. Discussed with patient etiology neuroma versus neuritis symptoms  bilateral foot.  Discussed with patient potential benefits of local corticosteroid injection for improvement of pain complaints.    4. Patient will follow-up in 3 weeks or p.r.n. for complaints

## 2024-06-17 RX ORDER — LIDOCAINE HYDROCHLORIDE 10 MG/ML
1 INJECTION INFILTRATION; PERINEURAL
Status: DISCONTINUED | OUTPATIENT
Start: 2024-05-21 | End: 2024-06-17 | Stop reason: HOSPADM

## 2024-06-17 RX ORDER — DEXAMETHASONE SODIUM PHOSPHATE 4 MG/ML
4 INJECTION, SOLUTION INTRA-ARTICULAR; INTRALESIONAL; INTRAMUSCULAR; INTRAVENOUS; SOFT TISSUE
Status: DISCONTINUED | OUTPATIENT
Start: 2024-05-21 | End: 2024-06-17 | Stop reason: HOSPADM

## 2024-06-24 NOTE — TELEPHONE ENCOUNTER
LM notifying patient that Dr. Martin was out of office today and tomorrow. He will address the Rx request when he returns.

## 2024-06-26 RX ORDER — NAPROXEN 500 MG/1
500 TABLET ORAL 2 TIMES DAILY WITH MEALS
Qty: 60 TABLET | Refills: 0 | Status: SHIPPED | OUTPATIENT
Start: 2024-06-26 | End: 2024-06-29

## 2024-06-29 RX ORDER — NAPROXEN 500 MG/1
500 TABLET ORAL 2 TIMES DAILY WITH MEALS
Qty: 60 TABLET | Refills: 0 | Status: SHIPPED | OUTPATIENT
Start: 2024-06-29

## 2024-06-29 NOTE — PROGRESS NOTES
Subjective:     Patient ID: Endy Rico is a 78 y.o. male.    Chief Complaint: Follow-up    Endy is a 78 y.o. male who presents to the podiatry clinic  with complaint of  bilateral foot pain. Onset of the symptoms was several months ago. Precipitating event: increased activity. Current symptoms include: stiffness. Aggravating factors: walking. Symptoms have gradually improved. Patient has had prior foot problems.  Treatment to date: corticosteroid injection which was effective. Patients rates pain 0/10 on pain scale.    Review of Systems   Constitutional: Negative for chills and fever.   Cardiovascular:  Positive for leg swelling. Negative for chest pain.   Respiratory:  Negative for cough and shortness of breath.    Gastrointestinal:  Negative for diarrhea, nausea and vomiting.   Neurological:  Positive for numbness and paresthesias.        Objective:     Physical Exam  Vitals reviewed.   Constitutional:       General: He is not in acute distress.     Appearance: Normal appearance. He is not ill-appearing.   HENT:      Head: Normocephalic.      Nose: Nose normal.   Pulmonary:      Effort: Pulmonary effort is normal. No respiratory distress.   Skin:     Capillary Refill: Capillary refill takes 2 to 3 seconds.   Neurological:      Mental Status: He is alert and oriented to person, place, and time.   Psychiatric:         Mood and Affect: Mood normal.         Behavior: Behavior normal.         Thought Content: Thought content normal.       Neurologic: Protective and light touch sensation intact bilateral lower extremity, positive paresthesias reported bilateral foot   Musculoskeletal: 5/5 muscle strength noted bilateral foot, left ankle joint range of motion is reduced but not painful, no masses noted bilateral foot, no pain and tenderness with palpation bilateral 1st MPJs, pain and tenderness with palpation metatarsophalangeal joint region of digits 2 through 5 bilateral foot  Dermatologic: No open lesions noted  bilateral foot, no rashes noted bilateral foot, no interdigital maceration noted bilateral foot, left ankle is swollen with warmth and redness noted   Vascular:  DP PT pulses palpable 1/4 bilateral foot, capillary fill time less than 3 seconds to distal aspect of the digits bilateral foot, +1 nonpitting edema noted to bilateral ankle left foot greater than right    Assessment:      Encounter Diagnosis   Name Primary?    Metatarsalgia of both feet Yes     Plan:     Endy was seen today for follow-up.    Diagnoses and all orders for this visit:    Metatarsalgia of both feet      I counseled the patient on his conditions, their implications and medical management.        1. Patient was examined and evaluated.    2. Patient made aware of improvement of previous sinus tarsi syndrome from last appointments local corticosteroid injection.  Patient was advised to continue with active passive range of motion of the bilateral lower extremity when at rest.  Patient will ice and elevate the bilateral lower extremity end of days.    3. Discussed with patient metatarsalgia.  Patient was advised to adjunct with OTC analgesics/NSAIDs for increased pain over time.    4. Patient will follow-up p.r.n. for complaints